# Patient Record
Sex: FEMALE | Race: BLACK OR AFRICAN AMERICAN | NOT HISPANIC OR LATINO | URBAN - METROPOLITAN AREA
[De-identification: names, ages, dates, MRNs, and addresses within clinical notes are randomized per-mention and may not be internally consistent; named-entity substitution may affect disease eponyms.]

---

## 2023-05-19 ENCOUNTER — OFFICE (OUTPATIENT)
Dept: URBAN - METROPOLITAN AREA CLINIC 14 | Facility: CLINIC | Age: 53
End: 2023-05-19

## 2023-05-19 VITALS
SYSTOLIC BLOOD PRESSURE: 116 MMHG | OXYGEN SATURATION: 100 % | WEIGHT: 135 LBS | DIASTOLIC BLOOD PRESSURE: 60 MMHG | HEART RATE: 86 BPM | HEIGHT: 62 IN

## 2023-05-19 DIAGNOSIS — K59.00 CONSTIPATION, UNSPECIFIED: ICD-10-CM

## 2023-05-19 DIAGNOSIS — Z98.84 BARIATRIC SURGERY STATUS: ICD-10-CM

## 2023-05-19 PROCEDURE — 99203 OFFICE O/P NEW LOW 30 MIN: CPT | Performed by: INTERNAL MEDICINE

## 2024-07-11 ENCOUNTER — OFFICE (OUTPATIENT)
Dept: URBAN - METROPOLITAN AREA CLINIC 9 | Facility: CLINIC | Age: 54
End: 2024-07-11
Payer: COMMERCIAL

## 2024-07-11 VITALS
HEIGHT: 62 IN | SYSTOLIC BLOOD PRESSURE: 114 MMHG | RESPIRATION RATE: 14 BRPM | OXYGEN SATURATION: 100 % | WEIGHT: 111 LBS | DIASTOLIC BLOOD PRESSURE: 65 MMHG | HEART RATE: 99 BPM

## 2024-07-11 DIAGNOSIS — K94.22 GASTROSTOMY INFECTION: ICD-10-CM

## 2024-07-11 DIAGNOSIS — Z79.1 LONG TERM (CURRENT) USE OF NON-STEROIDAL ANTI-INFLAMMATORIES: ICD-10-CM

## 2024-07-11 DIAGNOSIS — K25.3 ACUTE GASTRIC ULCER WITHOUT HEMORRHAGE OR PERFORATION: ICD-10-CM

## 2024-07-11 DIAGNOSIS — Z93.1 GASTROSTOMY STATUS: ICD-10-CM

## 2024-07-11 DIAGNOSIS — Z86.010 PERSONAL HISTORY OF COLONIC POLYPS: ICD-10-CM

## 2024-07-11 DIAGNOSIS — Z98.84 BARIATRIC SURGERY STATUS: ICD-10-CM

## 2024-07-11 PROCEDURE — 99215 OFFICE O/P EST HI 40 MIN: CPT | Performed by: NURSE PRACTITIONER

## 2024-07-11 RX ORDER — PANTOPRAZOLE SODIUM 40 MG/1
TABLET, DELAYED RELEASE ORAL
Qty: 60 | Refills: 3 | Status: ACTIVE
Start: 2024-07-11

## 2024-07-11 RX ORDER — CIPROFLOXACIN 500 MG/1
1000 TABLET, FILM COATED ORAL
Qty: 14 | Refills: 0 | Status: ACTIVE
Start: 2024-07-11

## 2024-08-12 ENCOUNTER — OFFICE (OUTPATIENT)
Dept: URBAN - METROPOLITAN AREA CLINIC 9 | Facility: CLINIC | Age: 54
End: 2024-08-12
Payer: COMMERCIAL

## 2024-08-12 VITALS
DIASTOLIC BLOOD PRESSURE: 66 MMHG | OXYGEN SATURATION: 99 % | WEIGHT: 105 LBS | SYSTOLIC BLOOD PRESSURE: 126 MMHG | HEIGHT: 61 IN

## 2024-08-12 DIAGNOSIS — Z98.84 BARIATRIC SURGERY STATUS: ICD-10-CM

## 2024-08-12 DIAGNOSIS — K25.9 GASTRIC ULCER, UNSPECIFIED AS ACUTE OR CHRONIC, WITHOUT HEMO: ICD-10-CM

## 2024-08-12 DIAGNOSIS — Z93.1 GASTROSTOMY STATUS: ICD-10-CM

## 2024-08-12 DIAGNOSIS — K25.3 ACUTE GASTRIC ULCER WITHOUT HEMORRHAGE OR PERFORATION: ICD-10-CM

## 2024-08-12 PROCEDURE — 99214 OFFICE O/P EST MOD 30 MIN: CPT | Performed by: NURSE PRACTITIONER

## 2024-08-12 NOTE — SERVICEHPINOTES
Ms. Ceja is a pleasant 54-year-old  female who Presents for follow-up for massive upper GI bleed secondary to duodenal ulcer and to the gastroduodenal artery status post exploratory laparotomy with repair of posterior due noted ulcer, ligation of gastroduodenal artery, revision of previous gastrojejunostomy anastomosis from prior gastric bypass. Records obtained dinner in the chart from 06/16/2024. Surgery of [facility]. She was last seen by [facility] this past Friday and her GJ tube was removed. She was referred to gastro 1 July 11, 2024 and significant records missing at that time. She did have an obvious GJ tube infection/cellulitis and she was treated with antibiotics and this resolved. As above, had GJ tube pulled by [facility] last Friday. She is doing well and eating well. No issues. She is set for EGD and colonoscopy September 2024. She knows to keep procedures. She knows to keep taking pantoprazole 40 mg 30 minutes before breakfast and dinner. She knows to refrain from NSAID use and she is doing well overall.
br
OV HPI 7/11/24:luis  presents for follow-up from [facility] hospitalization where she was recently discharged for an extensive stay where she was transferred from [facility].  Records are missing.  Records have been requested from both [facility] as well as [facility].  Apparently the patient presented to the hospital in [facility] for melena and abdominal pain.  Apparently she was transferred to [facility] for suspected perforated ulcers and bleeding. She underwent EGD by Dr. Trisitn Larose 06/17/2024 with findings of normal esophagus, previous gastric bypass Su-en-Y gastrojejunostomy with gastrojejunal anastomosis with significant ulceration. Normal examined jejunum. She was apparently eating times a BC powders at that time for chronic pain. They recommended to avoid all NSAIDs, ppi p.o. b.i.d., consider repeat EGD in 6 weeks if able to hold off NSAIDs. And possible surgery as unlikely any recurrent bleeding would be amenable to endoscopy therapy based on the size and extent of ulceration. This is only records noted today. Will wait on records from [facility] as well as [facility]. Suspect that she had continued bleeding and became unstable as she required surgery and recently had staples removed from her abdomen. Suspected that she had some type of perforated ulcer continued bleeding required surgical therapy. There is a significant midline abdominal incision with Dermabond glue stool present. She states her PCP in [place], [place] removed the staples recently. She does have a GJ tube present in the left mid abdomen. Suspect that she needed some form of nutrition while she was hospitalized from the perforated ulcers and to bypass the stomach. This was placed by surgery. She has plain since for follow-up. She has not heard from surgeon. She was advised to call the number on her discharge papers to schedule a follow-up. It looks like she possibly had a follow-up with the surgeon scheduled for July 9, 2024 in [facility], but patient did not go. She was advised to call the surgeon. The GJ tube does appear to be infected and will treat with antibiotics. She was educated had id change the dressing and clean the site. She has been eating and drinking as well as taking her medication without any issue since being discharged. She is flushing the tube with water twice a day. Denies any fever, significant abdominal pain, nausea, vomiting. No longer having black/tarry stools.She is overdue for surveillance colonoscopy and she will likely need repeat EGD at the same time once she has recovered. She is taking PPI p.o. b.i.d.. She is advised to continue to do so for now and avoid all NSAIDs.